# Patient Record
Sex: FEMALE | URBAN - METROPOLITAN AREA
[De-identification: names, ages, dates, MRNs, and addresses within clinical notes are randomized per-mention and may not be internally consistent; named-entity substitution may affect disease eponyms.]

---
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
## 2024-05-02 NOTE — PROCEDURE: MEDICATION COUNSELING
Keep wounds clean and dry. You may shower tomorrow.  Do not remove surgical glue, it will fall off on its own.    Call the clinic at [phone] or return to the emergency department if you  have persistent fever >101.4, severe bleeding that does not stop in urine, pain not controlled with medications, severe nausea and vomiting limiting intake of liquids and solids, or any other concerns.       Niacinamide Pregnancy And Lactation Text: These medications are considered safe during pregnancy.
